# Patient Record
Sex: FEMALE | Race: WHITE
[De-identification: names, ages, dates, MRNs, and addresses within clinical notes are randomized per-mention and may not be internally consistent; named-entity substitution may affect disease eponyms.]

---

## 2018-10-27 ENCOUNTER — HOSPITAL ENCOUNTER (EMERGENCY)
Dept: HOSPITAL 80 - CED | Age: 38
Discharge: HOME | End: 2018-10-27
Payer: COMMERCIAL

## 2018-10-27 VITALS — SYSTOLIC BLOOD PRESSURE: 152 MMHG | DIASTOLIC BLOOD PRESSURE: 94 MMHG
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
## 2018-10-27 DIAGNOSIS — S56.424A: Primary | ICD-10-CM

## 2018-10-27 PROCEDURE — 0HQGXZZ REPAIR LEFT HAND SKIN, EXTERNAL APPROACH: ICD-10-PCS | Performed by: EMERGENCY MEDICINE

## 2018-10-27 NOTE — EDPHY
H & P


Time Seen by Provider: 10/27/18 01:35


HPI/ROS: 





Chief complaint: Laceration left middle finger, dorsum. 





HPI: 


30-year-old female sustained a laceration to the left middle finger on the 

dorsum of the middle phalanx just prior to admission.  She was at home doing a 

little bit of remodeling product where she is laying down some lab minute 

azul.  As she was using a utility knife putting quite a bit of pressure on 

it she sustained a laceration across the dorsum of the finger.  She denies any 

numbness or tingling paresthesias.  





Right  Handed 


Reports there is no numbness or loss of sensation.


Contamination:   None


FB possibility  maybe some dust  





Last Td or TDAP:  less than 5 years 





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








ROS


Neuro: No numbness or tingling or loss of sensation








Smoking Status: Never smoked


Physical Exam: 





Gen:   Well-developed.  Well-nourished.  No odor of alcohol.  Nontoxic.  

Afebrile.


Extremity: There is a 3 cm, linear   laceration that is  full thickness to the 

dorsum of the middle phalanx on the left hand.


She is able to keep the finger extended against resistance without any laxity.  

.


Function:    Without signs of tendon dysfunction, though a portion of the 

central slip was injured as seen on the exploration of the wound.


NV Status:  Intact


CMS:  Intact


Constitutional: 


 Initial Vital Signs











Temperature (C)  36.4 C   10/27/18 01:40


 


Heart Rate  68   10/27/18 01:40


 


Respiratory Rate  16   10/27/18 01:40


 


Blood Pressure  152/94 H  10/27/18 01:40


 


O2 Sat (%)  99   10/27/18 01:40








 











O2 Delivery Mode               Room Air














Allergies/Adverse Reactions: 


 





amoxicillin [Amoxicillin] Allergy (Intermediate, Verified 12/16/14 11:03)


 


erythromycin base [Erythromycin Base] Allergy (Intermediate, Verified 12/16/14 

11:03)


 


tetracycline [Tetracycline] Allergy (Intermediate, Verified 12/16/14 11:03)


 








Home Medications: 














 Medication  Instructions  Recorded


 


Amitriptyline HCl  10/27/18


 


Baclofen  10/27/18


 


Bactrim DS  10/27/18


 


Gabapentin  10/27/18


 


OXcarbazepine [Trileptal 300mg (*)]  10/27/18


 


amLODIPine BESYLATE [Norvasc 10 mg  10/27/18





(*)]  














Medical Decision Making


Procedures: 





Procedure:  Laceration repair.





Options presented to   patient,  consented to repair.


After skin prep with  chloraseptic the wound was anesthesized with  digital 

block with lidocaine 1 %   without epinephrine  


The wound was Cleansed with irrigation by Tech


The length of the wound was 3 cm. While the majority was over the top of the 

digit a0.5 cm portion did tail around to the ulnar aspect.  Distal sensation 

was intact.


Inspection and exploration of the wound, with gloved finger and forceps ,prior 

to closure revealed no evidence of foreign body.  However, a 2cm portion of the 

central slip extensor tendon apparatus was injured.


Closure was obtained using running 5 0 nylon.


At the end of the procedure, wound edges were well approximated and hemostasis 

was achieved.


Patient tolerated procedure well.


ED Course/Re-evaluation: 





Splint application of the 3rd digit by tech, under my supervision.  After 

application, area examined as well as splint examined , by me. Good alignment. 

Neurovascular status intact.


Differential Diagnosis: 





Diagnostic considerations include, but are not limited to, the following:


Laceration, retained FB ,tendon injury.





Departure





- Departure


Disposition: Home, Routine, Self-Care


Clinical Impression: 


 Tendon laceration





Laceration of finger


Qualifiers:


 Encounter type: initial encounter Finger: middle finger Damage to nail status: 

without damage Foreign body presence: without foreign body Laterality: left 

Qualified Code(s): S61.213A - Laceration without foreign body of left middle 

finger without damage to nail, initial encounter





Condition: Good


Instructions:  Finger Laceration (ED)


Additional Instructions: 


Keep the area clean and dry





He will need a splint to allow the skin to heal.  Wear this at all times, but 

change the dressing daily.





Do not change the dressing the 1st time around until the morning of Sunday, October 28th.





Follow up next week with the Hand Surgeon as part of the tendon was injured.


Referrals: 


Braden Campo MD [Medical Doctor] - 2-3 days, call for appt.